# Patient Record
Sex: MALE | Race: WHITE | HISPANIC OR LATINO | Employment: FULL TIME | URBAN - METROPOLITAN AREA
[De-identification: names, ages, dates, MRNs, and addresses within clinical notes are randomized per-mention and may not be internally consistent; named-entity substitution may affect disease eponyms.]

---

## 2021-11-17 ENCOUNTER — OFFICE VISIT (OUTPATIENT)
Dept: URGENT CARE | Facility: CLINIC | Age: 26
End: 2021-11-17
Payer: COMMERCIAL

## 2021-11-17 VITALS
HEIGHT: 64 IN | OXYGEN SATURATION: 96 % | BODY MASS INDEX: 24.75 KG/M2 | DIASTOLIC BLOOD PRESSURE: 76 MMHG | HEART RATE: 90 BPM | RESPIRATION RATE: 18 BRPM | TEMPERATURE: 98.7 F | SYSTOLIC BLOOD PRESSURE: 114 MMHG | WEIGHT: 145 LBS

## 2021-11-17 DIAGNOSIS — K64.5 HEMORRHOID THROMBOSIS: ICD-10-CM

## 2021-11-17 PROCEDURE — 99202 OFFICE O/P NEW SF 15 MIN: CPT | Performed by: FAMILY MEDICINE

## 2021-11-17 RX ORDER — IBUPROFEN 200 MG
600 TABLET ORAL ONCE
Status: COMPLETED | OUTPATIENT
Start: 2021-11-17 | End: 2021-11-17

## 2021-11-17 RX ORDER — IBUPROFEN 600 MG/1
600 TABLET ORAL EVERY 8 HOURS PRN
Qty: 15 TABLET | Refills: 0 | Status: SHIPPED | OUTPATIENT
Start: 2021-11-17

## 2021-11-17 RX ADMIN — Medication 600 MG: at 13:25

## 2021-11-17 NOTE — PROGRESS NOTES
"Subjective     Pablo Domingo Ross is a 26 y.o. male who presents with Hemorrhoids (x3 days, external hemorrhoids bleeding this morning)    - This is a pleasant and nontoxic appearing 26 y.o. male with c/o ~ days w/ some pain/swelling anal area. Feels swollen hemorrhoid like bump, a little red blood as well from it when wipes. Has Hx hemorrhoids and feels same.  No NVFC, eating well. No stool changes.       ALLERGIES:  Patient has no known allergies.     PMH:  History reviewed. No pertinent past medical history.     PSH:  History reviewed. No pertinent surgical history.    MEDS:    Current Outpatient Medications:   •  ibuprofen (MOTRIN) 600 MG Tab, Take 1 Tablet by mouth every 8 hours as needed., Disp: 15 Tablet, Rfl: 0  •  Lidocaine-Hydrocortisone Ace 1-1 % Cream, AAA BID x 5 days, Disp: 30 g, Rfl: 0    ** I have documented what I find to be significant in regards to past medical, social, family and surgical history  in my HPI or under PMH/PSH/FH review section, otherwise it is noncontributory **             HPI    Review of Systems   All other systems reviewed and are negative.             Objective     /76   Pulse 90   Temp 37.1 °C (98.7 °F) (Temporal)   Resp 18   Ht 1.626 m (5' 4\")   Wt 65.8 kg (145 lb)   SpO2 96%   BMI 24.89 kg/m²      Physical Exam  Vitals and nursing note reviewed.   Constitutional:       General: He is not in acute distress.     Appearance: Normal appearance. He is well-developed.   HENT:      Head: Normocephalic and atraumatic.   Eyes:      General: No scleral icterus.  Cardiovascular:      Heart sounds: Normal heart sounds. No murmur heard.      Pulmonary:      Effort: Pulmonary effort is normal. No respiratory distress.   Genitourinary:         Comments: Lt perianal region w/ olive sized thrombosed hemorrhoid   Skin:     Coloration: Skin is not jaundiced or pale.   Neurological:      Mental Status: He is alert.      Motor: No abnormal muscle tone.   Psychiatric:    "      Mood and Affect: Mood normal.         Behavior: Behavior normal.         Assessment & Plan       1. Hemorrhoid thrombosis  Referral to Colorectal Surgery    ibuprofen (MOTRIN) 600 MG Tab    Lidocaine-Hydrocortisone Ace 1-1 % Cream       - Dx, plan & d/c instructions discussed   - Rest, stay hydrated  - Sitz soaks 2-3x/day  - E.R. precautions discussed     Asked to kindly follow up with their PCP's office in 3-5 days for a recheck, ER if not improving or feeling/getting worse.    Any realistic side effects of medications that may have been given today reviewed.     Patient left in stable condition

## 2022-01-22 ENCOUNTER — OFFICE VISIT (OUTPATIENT)
Dept: URGENT CARE | Facility: CLINIC | Age: 27
End: 2022-01-22
Payer: COMMERCIAL

## 2022-01-22 ENCOUNTER — HOSPITAL ENCOUNTER (OUTPATIENT)
Facility: MEDICAL CENTER | Age: 27
End: 2022-01-22
Attending: NURSE PRACTITIONER
Payer: COMMERCIAL

## 2022-01-22 VITALS
BODY MASS INDEX: 18.69 KG/M2 | SYSTOLIC BLOOD PRESSURE: 102 MMHG | WEIGHT: 112.2 LBS | HEART RATE: 89 BPM | DIASTOLIC BLOOD PRESSURE: 70 MMHG | RESPIRATION RATE: 16 BRPM | OXYGEN SATURATION: 97 % | TEMPERATURE: 98.5 F | HEIGHT: 65 IN

## 2022-01-22 DIAGNOSIS — R33.9 INCOMPLETE BLADDER EMPTYING: ICD-10-CM

## 2022-01-22 DIAGNOSIS — R30.0 DYSURIA: ICD-10-CM

## 2022-01-22 DIAGNOSIS — N30.01 ACUTE CYSTITIS WITH HEMATURIA: Primary | ICD-10-CM

## 2022-01-22 LAB
APPEARANCE UR: NORMAL
BILIRUB UR STRIP-MCNC: NEGATIVE MG/DL
COLOR UR AUTO: YELLOW
GLUCOSE UR STRIP.AUTO-MCNC: NEGATIVE MG/DL
KETONES UR STRIP.AUTO-MCNC: NEGATIVE MG/DL
LEUKOCYTE ESTERASE UR QL STRIP.AUTO: NORMAL
NITRITE UR QL STRIP.AUTO: NEGATIVE
PH UR STRIP.AUTO: 7 [PH] (ref 5–8)
PROT UR QL STRIP: NEGATIVE MG/DL
RBC UR QL AUTO: NORMAL
SP GR UR STRIP.AUTO: 1.01
UROBILINOGEN UR STRIP-MCNC: NORMAL MG/DL

## 2022-01-22 PROCEDURE — 87077 CULTURE AEROBIC IDENTIFY: CPT

## 2022-01-22 PROCEDURE — 81002 URINALYSIS NONAUTO W/O SCOPE: CPT | Performed by: NURSE PRACTITIONER

## 2022-01-22 PROCEDURE — 87186 SC STD MICRODIL/AGAR DIL: CPT

## 2022-01-22 PROCEDURE — 87086 URINE CULTURE/COLONY COUNT: CPT

## 2022-01-22 PROCEDURE — 99213 OFFICE O/P EST LOW 20 MIN: CPT | Performed by: NURSE PRACTITIONER

## 2022-01-22 RX ORDER — PHENAZOPYRIDINE HYDROCHLORIDE 200 MG/1
200 TABLET, FILM COATED ORAL 3 TIMES DAILY
Qty: 6 TABLET | Refills: 0 | Status: SHIPPED | OUTPATIENT
Start: 2022-01-22 | End: 2022-01-24

## 2022-01-22 RX ORDER — POLYETHYLENE GLYCOL 3350 17 G/17G
17 POWDER, FOR SOLUTION ORAL DAILY
COMMUNITY

## 2022-01-22 RX ORDER — SULFAMETHOXAZOLE AND TRIMETHOPRIM 800; 160 MG/1; MG/1
1 TABLET ORAL EVERY 12 HOURS
Qty: 28 TABLET | Refills: 0 | Status: SHIPPED | OUTPATIENT
Start: 2022-01-22 | End: 2022-02-05

## 2022-01-22 ASSESSMENT — ENCOUNTER SYMPTOMS
FLANK PAIN: 0
CHILLS: 0
VOMITING: 0

## 2022-01-22 NOTE — PROGRESS NOTES
"Subjective:     Pablo Ross is a 26 y.o. male who presents for Dysuria (started thursday, got worse friday, just had hemroids removed a week ago ) and Blood in Urine (\" i have seen blood in my urine, felt like a chunk of blood came out this afternoon\")      Dysuria   This is a new problem. The current episode started in the past 7 days (Pablo is a pleasant 26 year old male who presents to  today for approximately 2 days of dysuria, incomplete bladder emptying ). The problem occurs every urination. The problem has been gradually worsening. The pain is at a severity of 8/10. There has been no fever. Associated symptoms include hematuria. Pertinent negatives include no chills, discharge, flank pain, frequency, urgency or vomiting. He has tried nothing for the symptoms.   He notes he did recently have a hemroidectomy, but does not believe a catheter was used during his procedure. He denies any risk for STI. He does state he has been experiencing incomplete bladder emptying for the past few year.     Review of Systems   Constitutional: Negative for chills.   Gastrointestinal: Negative for vomiting.   Genitourinary: Positive for dysuria and hematuria. Negative for flank pain, frequency and urgency.       PMH: History reviewed. No pertinent past medical history.  ALLERGIES: No Known Allergies  SURGHX: History reviewed. No pertinent surgical history.  SOCHX:   Social History     Socioeconomic History   • Marital status: Single     Spouse name: Not on file   • Number of children: Not on file   • Years of education: Not on file   • Highest education level: Not on file   Occupational History   • Not on file   Tobacco Use   • Smoking status: Never Smoker   • Smokeless tobacco: Never Used   Vaping Use   • Vaping Use: Never used   Substance and Sexual Activity   • Alcohol use: Yes     Comment: rarely   • Drug use: Not Currently   • Sexual activity: Not on file   Other Topics Concern   • Not on file   Social " "History Narrative   • Not on file     Social Determinants of Health     Financial Resource Strain:    • Difficulty of Paying Living Expenses: Not on file   Food Insecurity:    • Worried About Running Out of Food in the Last Year: Not on file   • Ran Out of Food in the Last Year: Not on file   Transportation Needs:    • Lack of Transportation (Medical): Not on file   • Lack of Transportation (Non-Medical): Not on file   Physical Activity:    • Days of Exercise per Week: Not on file   • Minutes of Exercise per Session: Not on file   Stress:    • Feeling of Stress : Not on file   Social Connections:    • Frequency of Communication with Friends and Family: Not on file   • Frequency of Social Gatherings with Friends and Family: Not on file   • Attends [employer] Services: Not on file   • Active Member of Clubs or Organizations: Not on file   • Attends Club or Organization Meetings: Not on file   • Marital Status: Not on file   Intimate Partner Violence:    • Fear of Current or Ex-Partner: Not on file   • Emotionally Abused: Not on file   • Physically Abused: Not on file   • Sexually Abused: Not on file   Housing Stability:    • Unable to Pay for Housing in the Last Year: Not on file   • Number of Places Lived in the Last Year: Not on file   • Unstable Housing in the Last Year: Not on file     FH: History reviewed. No pertinent family history.      Objective:   /70 (BP Location: Right arm, Patient Position: Sitting, BP Cuff Size: Adult)   Pulse 89   Temp 36.9 °C (98.5 °F) (Temporal)   Resp 16   Ht 1.651 m (5' 5\")   Wt 50.9 kg (112 lb 3.2 oz)   SpO2 97%   BMI 18.67 kg/m²     Physical Exam  Vitals and nursing note reviewed.   Constitutional:       General: He is not in acute distress.     Appearance: Normal appearance. He is not ill-appearing.   HENT:      Head: Normocephalic and atraumatic.      Right Ear: External ear normal.      Left Ear: External ear normal.      Nose: No congestion or rhinorrhea.      " Mouth/Throat:      Mouth: Mucous membranes are moist.   Eyes:      Extraocular Movements: Extraocular movements intact.      Pupils: Pupils are equal, round, and reactive to light.   Cardiovascular:      Rate and Rhythm: Normal rate and regular rhythm.      Pulses: Normal pulses.      Heart sounds: Normal heart sounds.   Pulmonary:      Effort: Pulmonary effort is normal. No respiratory distress.      Breath sounds: Normal breath sounds. No stridor. No wheezing, rhonchi or rales.   Chest:      Chest wall: No tenderness.   Abdominal:      General: Abdomen is flat. Bowel sounds are normal.      Palpations: Abdomen is soft.      Tenderness: There is no abdominal tenderness. There is no right CVA tenderness or left CVA tenderness.   Musculoskeletal:         General: Normal range of motion.      Cervical back: Normal range of motion and neck supple.   Skin:     General: Skin is warm and dry.      Capillary Refill: Capillary refill takes less than 2 seconds.   Neurological:      General: No focal deficit present.      Mental Status: He is alert and oriented to person, place, and time. Mental status is at baseline.   Psychiatric:         Mood and Affect: Mood normal.         Behavior: Behavior normal.         Thought Content: Thought content normal.         Judgment: Judgment normal.         Assessment/Plan:   Assessment    1. Acute cystitis with hematuria  sulfamethoxazole-trimethoprim (BACTRIM DS) 800-160 MG tablet    phenazopyridine (PYRIDIUM) 200 MG Tab    Referral to Urology   2. Dysuria  POCT Urinalysis    Urine Culture    phenazopyridine (PYRIDIUM) 200 MG Tab    Referral to Urology   3. Incomplete bladder emptying  Referral to Urology     Prescription was sent in to preferred pharmacy. AVS was given and reviewed with patient. Patient educated on red flags of UTI and encouraged to seek care back in UC or ER for  fever, chills, flank pain, or worsening symptoms.     AVS handout given and reviewed with patient. Pt  educated on red flags and when to seek treatment back in ER or UC.

## 2022-01-23 DIAGNOSIS — R30.0 DYSURIA: ICD-10-CM

## 2022-01-26 LAB
BACTERIA UR CULT: ABNORMAL
BACTERIA UR CULT: ABNORMAL
SIGNIFICANT IND 70042: ABNORMAL
SITE SITE: ABNORMAL
SOURCE SOURCE: ABNORMAL

## 2022-02-15 ENCOUNTER — HOSPITAL ENCOUNTER (OUTPATIENT)
Dept: RADIOLOGY | Facility: MEDICAL CENTER | Age: 27
End: 2022-02-15
Attending: PHYSICIAN ASSISTANT
Payer: COMMERCIAL

## 2022-02-15 DIAGNOSIS — N39.0 URINARY TRACT INFECTION WITHOUT HEMATURIA, SITE UNSPECIFIED: ICD-10-CM

## 2022-02-15 PROCEDURE — 76775 US EXAM ABDO BACK WALL LIM: CPT

## 2022-03-17 ENCOUNTER — APPOINTMENT (OUTPATIENT)
Dept: RADIOLOGY | Facility: MEDICAL CENTER | Age: 27
End: 2022-03-17
Attending: PHYSICIAN ASSISTANT
Payer: COMMERCIAL

## 2022-03-22 ENCOUNTER — HOSPITAL ENCOUNTER (OUTPATIENT)
Dept: RADIOLOGY | Facility: MEDICAL CENTER | Age: 27
End: 2022-03-22
Attending: PHYSICIAN ASSISTANT
Payer: COMMERCIAL

## 2022-03-22 DIAGNOSIS — N13.30 HYDRONEPHROSIS, UNSPECIFIED HYDRONEPHROSIS TYPE: ICD-10-CM

## 2022-03-22 PROCEDURE — 74178 CT ABD&PLV WO CNTR FLWD CNTR: CPT

## 2022-03-22 PROCEDURE — 700117 HCHG RX CONTRAST REV CODE 255: Performed by: PHYSICIAN ASSISTANT

## 2022-03-22 RX ADMIN — IOHEXOL 100 ML: 350 INJECTION, SOLUTION INTRAVENOUS at 15:43
